# Patient Record
Sex: FEMALE | Race: WHITE | NOT HISPANIC OR LATINO | Employment: UNEMPLOYED | URBAN - METROPOLITAN AREA
[De-identification: names, ages, dates, MRNs, and addresses within clinical notes are randomized per-mention and may not be internally consistent; named-entity substitution may affect disease eponyms.]

---

## 2019-10-03 NOTE — PROGRESS NOTES
[facility] Note    Chief Complaint      Chief Complaint   Patient presents with    Nasal Congestion    Back Pain       History of Present Illness      Leti Petersen is a 64 y.o. WF with Anxiety, Depression, Insomnia, GERD, nicotine Dependence presents to re-est care with me and to fu complaints of URI.  Last visit with me was at [facility].  Await old records for review.  Pt reports she did not get labs I ordered for her last visit.     HLD - Pt was otld this by prev PCP.  Will repeat FLP    AR - Pt on Zyrtec 10 mg/d and Flonase 2 SEN/d.    Anxiety - Pt on Sertraline 100 mg/d.    Depression - Pt is on Sertraline 100 mg/d and Trazodone 100 mg/d.      Insomnia - Pt is on Trazodone 100 mg/d.    GERD - Pt on Omeprazole prn.     Osteopenia - Rec Calcium 1000 mg/d and vit D 3 1000u/d.  Rec wt bearing exercise every other day.     Migraine aura without HA - Plans to see a new Ophtho.      Nicotine Dependence - Pt reports she had CT lung Ca screening that was wnl.  I await her old records for review. Pt cut back to 1/2 ppd.     ?Renal insuff - Pt reports I told her her kidney funciton may have been slightly low last lab check.  Will get records to review.  Will avoid NSAIDS for now.     HCM - Flu - 9/26/19;  Td - > 10 yrs;  PCV 13 - 9/26/19; Shingrix - none - will get from pharm; PAP - plans to sched with Dr. Lama;  MGM - 2/19 - wnl per pt at [facility]; DEXA - 2/19 - Osteopenia per pt at [facility]; C-scope - none - plans to sched with Dr. South; Prev Ophtho - Dr. Moreno    Past Medical History:  Past Medical History:   Diagnosis Date    Allergic rhinitis     Anxiety     Depression     GERD (gastroesophageal reflux disease)     Insomnia     Nicotine dependence     Osteopenia     Rosacea        Past Surgical History:   has a past surgical history that includes rhinoplasty.    Family History:  family history includes Alzheimer's disease in her mother; Diabetes in her brother and father; Heart attack  "(age of onset: 67) in her maternal grandfather; Hyperlipidemia in her sister and sister; Hypertension in her sister and sister; Stomach cancer in her paternal grandmother; Stroke (age of onset: 78) in her mother.     Social History:  Social History     Tobacco Use    Smoking status: Current Every Day Smoker     Packs/day: 1.00     Years: 52.00     Pack years: 52.00     Types: Cigarettes   Substance Use Topics    Alcohol use: Yes     Frequency: Monthly or less     Drinks per session: 1 or 2    Drug use: Never       Review of Systems   Constitutional: Negative for chills, diaphoresis and fever.   HENT: Positive for congestion, sore throat and tinnitus. Negative for ear pain and hearing loss.         +postnasal drip; Left ear congestion; chronic hoarseness reports neg sargent from ENT in past.    Eyes: Negative for blurred vision and double vision.        Sees aura - L > RT eye - "sees steam".    Respiratory: Negative for cough, shortness of breath and wheezing.    Cardiovascular: Positive for palpitations. Negative for chest pain.        When she lies down < 1 minute and self resolves.    Gastrointestinal: Positive for blood in stool and heartburn. Negative for abdominal pain, constipation, diarrhea, melena, nausea and vomiting.        Ran out of PPI.  Has hemorrhoids and notes blood in stool 2/month - due for C-scope and has contact to set up with Dr. South. It does not fill toilet.  She is unsure if occurs after hard stools.  Denies straining.   Genitourinary: Positive for dysuria. Negative for frequency.        Intermittent - last had this ~ 1 wk ago.    Musculoskeletal: Positive for back pain and neck pain.        She fell 2 mos ago - slipped.  She did not see anyone for this and reports midback pain and neck pain since her fall.  "I hear my neck pop when I turn it".  3/10 in severity. The neck pain can radiate to her left arm and chest.  No allev factors. Worse if she uses her Left arm a lot.  Intermittent. "   Skin: Positive for itching and rash.        Rosacea   Neurological: Positive for tingling. Negative for dizziness and weakness.        In left hand at times - since fall;  Imbalance.   Endo/Heme/Allergies: Negative for polydipsia. Does not bruise/bleed easily.   Psychiatric/Behavioral: Negative for depression. The patient is not nervous/anxious.         Medications:  Outpatient Encounter Medications as of 10/4/2019   Medication Sig Dispense Refill    cetirizine (ZYRTEC) 10 MG tablet Take 10 mg by mouth daily as needed for Allergies.      fluticasone propionate (FLONASE) 50 mcg/actuation nasal spray 1 spray by Each Nostril route as needed for Rhinitis.      metroNIDAZOLE (METROGEL) 0.75 % gel Apply topically daily as needed.      omeprazole (PRILOSEC) 20 MG capsule Take 20 mg by mouth daily as needed.      sertraline (ZOLOFT) 100 MG tablet Take 100 mg by mouth once daily.      traZODone (DESYREL) 100 MG tablet Take 100 mg by mouth every evening.      FLUAD 6954-0951, 65 YR UP,,PF, 45 mcg (15 mcg x 3)/0.5 mL Syrg ADM 0.5ML IM UTD  0    tiZANidine (ZANAFLEX) 2 MG tablet Take 1 tablet (2 mg total) by mouth nightly as needed. 30 tablet 0     No facility-administered encounter medications on file as of 10/4/2019.        Current Outpatient Medications:     cetirizine (ZYRTEC) 10 MG tablet, Take 10 mg by mouth daily as needed for Allergies., Disp: , Rfl:     fluticasone propionate (FLONASE) 50 mcg/actuation nasal spray, 1 spray by Each Nostril route as needed for Rhinitis., Disp: , Rfl:     metroNIDAZOLE (METROGEL) 0.75 % gel, Apply topically daily as needed., Disp: , Rfl:     omeprazole (PRILOSEC) 20 MG capsule, Take 20 mg by mouth daily as needed., Disp: , Rfl:     sertraline (ZOLOFT) 100 MG tablet, Take 100 mg by mouth once daily., Disp: , Rfl:     traZODone (DESYREL) 100 MG tablet, Take 100 mg by mouth every evening., Disp: , Rfl:     FLUAD [id number], 65 YR UP,,PF, 45 mcg (15 mcg x 3)/0.5 mL Syrg, ADM  "0.5ML IM UTD, Disp: , Rfl: 0    tiZANidine (ZANAFLEX) 2 MG tablet, Take 1 tablet (2 mg total) by mouth nightly as needed., Disp: 30 tablet, Rfl: 0    Allergies:  Review of patient's allergies indicates:  No Known Allergies    Health Maintenance:  Immunization History   Administered Date(s) Administered    Pneumococcal Conjugate - 13 Valent 09/26/2019      Health Maintenance   Topic Date Due    Hepatitis C Screening  1955    Lipid Panel  1955    TETANUS VACCINE  06/14/1971    Mammogram  06/14/1993    DEXA SCAN  06/14/1993    Colonoscopy  06/14/2003    LDCT Lung Screen  06/14/2008    Pneumococcal Vaccine (65+ Low/Medium Risk) (2 of 2 - PPSV23) 09/26/2020      Objective:      Vital Signs  Temp: 98.3 °F (36.8 °C)  Temp src: Oral  Pulse: 76  Resp: 14  SpO2: 96 %  BP: 108/65  BP Location: Left arm  Patient Position: Sitting  Pain Score:   2  Pain Loc: Back  Height and Weight  Height: 5' 6" (167.6 cm)  Weight: 66 kg (145 lb 8.1 oz)  BSA (Calculated - sq m): 1.75 sq meters  BMI (Calculated): 23.5  Weight in (lb) to have BMI = 25: 154.6]    Laboratory:    Physical Exam   Constitutional: She is oriented to person, place, and time. She appears well-developed and well-nourished. No distress.   Hoarseness - chronic    HENT:   Head: Normocephalic and atraumatic.   Left cerumen impaction;  RT TM - wnl   Eyes: Pupils are equal, round, and reactive to light. EOM are normal.   manohar Cataracts?   Neck: Normal range of motion. Neck supple. No thyromegaly present.   Cardiovascular: Normal rate, regular rhythm, normal heart sounds and intact distal pulses.   Pulmonary/Chest: Effort normal. No respiratory distress. She has no wheezes.   Abdominal: Soft. Bowel sounds are normal. She exhibits no distension and no mass. There is no tenderness. There is no rebound.   Musculoskeletal:   Pain on forward flexion of neck and flexion and rotation of neck  to the left.  + Tension over manohar paraspinal muscles.  NTTP;  No CVA TTP. " Neg straight leg raise manohar. Pain to spine when rotates to RT and flexes laterally to left and on forward flexion.  FROM.    Lymphadenopathy:     She has no cervical adenopathy.   Neurological: She is alert and oriented to person, place, and time.   Gait wnl;  Rhomberg neg; no Pronator drift;  Patellar DTR's - 2+ manohar;  IGNACIO - wnl;  Finger to nose - wnl   Skin: Skin is warm and dry. She is not diaphoretic.   Psychiatric: She has a normal mood and affect.   Nursing note and vitals reviewed.          Assessment:       1. Hyperlipidemia, unspecified hyperlipidemia type    2. Dysuria    3. Acute neck pain    4. Acute thoracic back pain, unspecified back pain laterality    5. Osteopenia, unspecified location    6. Insomnia, unspecified type    7. Depression, unspecified depression type    8. Anxiety    9. Allergic rhinitis, unspecified seasonality, unspecified trigger    10. Cigarette nicotine dependence without complication        Leti Petersen is a 64 y.o.female with:    1. Hyperlipidemia, unspecified hyperlipidemia type  - Comprehensive metabolic panel; Future  - Lipid panel; Future  Will repeat FLP and get old records to review.    2. Dysuria  - POCT urine dipstick without microscope  - Urinalysis Microscopic  - Urine culture  Dip UA - + 250 Bld - will check UA microscopy and Ucx.    3. Acute neck pain  - tiZANidine (ZANAFLEX) 2 MG tablet; Take 1 tablet (2 mg total) by mouth nightly as needed.  Dispense: 30 tablet; Refill: 0  -Will avoid NSAIDS given ? Renal insufficiency.  Will try a trial of Tizanidine 2 mg po QHS prn pain.  Warned pt not to drive after taking.  Offered PTx but pt declined due to transportation and said she will alert me if she changes her mind. Suspect strain and DJD    4. Acute thoracic back pain, unspecified back pain laterality  - tiZANidine (ZANAFLEX) 2 MG tablet; Take 1 tablet (2 mg total) by mouth nightly as needed.  Dispense: 30 tablet; Refill: 0  -Will avoid NSAIDS given ? Renal  insufficiency.  Will try a trial of Tizanidine 2 mg po QHS prn pain.  Warned pt not to drive after taking.  Offered PTx but pt declined due to transportation and said she will alert me if she changes her mind. Suspect strain. If no improvement consider Xrays    5. Osteopenia, unspecified location  -Rec calcium + D as above.    6. Insomnia, unspecified type  -Stable on current    7. Depression, unspecified depression type  -Stable on current    8. Anxiety  -Stable on current    9. Allergic rhinitis, unspecified seasonality, unspecified trigger  -Cont Zyrtec and Flonase.     10. Cigarette nicotine dependence without complication  -Will get old records to review recent CT chest.        Chronic conditions status updated as per HPI.  Other than changes above, cont current medications and maintain follow up with specialists.  Return to clinic in 6 wks.    Huma Marti MD  [facility]

## 2019-10-04 ENCOUNTER — OFFICE VISIT (OUTPATIENT)
Dept: PRIMARY CARE CLINIC | Facility: CLINIC | Age: 64
End: 2019-10-04
Payer: MEDICARE

## 2019-10-04 ENCOUNTER — TELEPHONE (OUTPATIENT)
Dept: PRIMARY CARE CLINIC | Facility: CLINIC | Age: 64
End: 2019-10-04

## 2019-10-04 VITALS
BODY MASS INDEX: 23.38 KG/M2 | HEART RATE: 76 BPM | DIASTOLIC BLOOD PRESSURE: 65 MMHG | HEIGHT: 66 IN | TEMPERATURE: 98 F | RESPIRATION RATE: 14 BRPM | WEIGHT: 145.5 LBS | OXYGEN SATURATION: 96 % | SYSTOLIC BLOOD PRESSURE: 108 MMHG

## 2019-10-04 DIAGNOSIS — F17.210 CIGARETTE NICOTINE DEPENDENCE WITHOUT COMPLICATION: ICD-10-CM

## 2019-10-04 DIAGNOSIS — M85.80 OSTEOPENIA, UNSPECIFIED LOCATION: ICD-10-CM

## 2019-10-04 DIAGNOSIS — G47.00 INSOMNIA, UNSPECIFIED TYPE: ICD-10-CM

## 2019-10-04 DIAGNOSIS — M54.2 ACUTE NECK PAIN: ICD-10-CM

## 2019-10-04 DIAGNOSIS — R30.0 DYSURIA: ICD-10-CM

## 2019-10-04 DIAGNOSIS — J30.9 ALLERGIC RHINITIS, UNSPECIFIED SEASONALITY, UNSPECIFIED TRIGGER: ICD-10-CM

## 2019-10-04 DIAGNOSIS — F32.A DEPRESSION, UNSPECIFIED DEPRESSION TYPE: ICD-10-CM

## 2019-10-04 DIAGNOSIS — E78.5 HYPERLIPIDEMIA, UNSPECIFIED HYPERLIPIDEMIA TYPE: Primary | ICD-10-CM

## 2019-10-04 DIAGNOSIS — M54.6 ACUTE THORACIC BACK PAIN, UNSPECIFIED BACK PAIN LATERALITY: ICD-10-CM

## 2019-10-04 DIAGNOSIS — F41.9 ANXIETY: ICD-10-CM

## 2019-10-04 LAB
BILIRUB SERPL-MCNC: NEGATIVE MG/DL
BLOOD URINE, POC: ABNORMAL
COLOR, POC UA: YELLOW
GLUCOSE UR QL STRIP: NORMAL
KETONES UR QL STRIP: NEGATIVE
LEUKOCYTE ESTERASE URINE, POC: ABNORMAL
MICROSCOPIC COMMENT: ABNORMAL
NITRITE, POC UA: NEGATIVE
PH, POC UA: 6
PROTEIN, POC: NEGATIVE
RBC #/AREA URNS AUTO: 12 /HPF (ref 0–4)
SPECIFIC GRAVITY, POC UA: 1
SQUAMOUS #/AREA URNS AUTO: 0 /HPF
UROBILINOGEN, POC UA: NORMAL

## 2019-10-04 PROCEDURE — 87086 URINE CULTURE/COLONY COUNT: CPT

## 2019-10-04 PROCEDURE — 99499 UNLISTED E&M SERVICE: CPT | Mod: S$GLB,,, | Performed by: INTERNAL MEDICINE

## 2019-10-04 PROCEDURE — 3288F FALL RISK ASSESSMENT DOCD: CPT | Mod: CPTII,S$GLB,, | Performed by: INTERNAL MEDICINE

## 2019-10-04 PROCEDURE — 81002 URINALYSIS NONAUTO W/O SCOPE: CPT | Mod: S$GLB,,, | Performed by: INTERNAL MEDICINE

## 2019-10-04 PROCEDURE — 81002 POCT URINE DIPSTICK WITHOUT MICROSCOPE: ICD-10-PCS | Mod: S$GLB,,, | Performed by: INTERNAL MEDICINE

## 2019-10-04 PROCEDURE — 1100F PR PT FALLS ASSESS DOC 2+ FALLS/FALL W/INJURY/YR: ICD-10-PCS | Mod: CPTII,S$GLB,, | Performed by: INTERNAL MEDICINE

## 2019-10-04 PROCEDURE — 99999 PR PBB SHADOW E&M-NEW PATIENT-LVL III: CPT | Mod: PBBFAC,,, | Performed by: INTERNAL MEDICINE

## 2019-10-04 PROCEDURE — 99999 PR PBB SHADOW E&M-NEW PATIENT-LVL III: ICD-10-PCS | Mod: PBBFAC,,, | Performed by: INTERNAL MEDICINE

## 2019-10-04 PROCEDURE — 3288F PR FALLS RISK ASSESSMENT DOCUMENTED: ICD-10-PCS | Mod: CPTII,S$GLB,, | Performed by: INTERNAL MEDICINE

## 2019-10-04 PROCEDURE — 81001 URINALYSIS AUTO W/SCOPE: CPT

## 2019-10-04 PROCEDURE — 99214 PR OFFICE/OUTPT VISIT, EST, LEVL IV, 30-39 MIN: ICD-10-PCS | Mod: 25,S$GLB,, | Performed by: INTERNAL MEDICINE

## 2019-10-04 PROCEDURE — 99499 RISK ADDL DX/OHS AUDIT: ICD-10-PCS | Mod: S$GLB,,, | Performed by: INTERNAL MEDICINE

## 2019-10-04 PROCEDURE — 1100F PTFALLS ASSESS-DOCD GE2>/YR: CPT | Mod: CPTII,S$GLB,, | Performed by: INTERNAL MEDICINE

## 2019-10-04 PROCEDURE — 99214 OFFICE O/P EST MOD 30 MIN: CPT | Mod: 25,S$GLB,, | Performed by: INTERNAL MEDICINE

## 2019-10-04 RX ORDER — FLUTICASONE PROPIONATE 50 MCG
2 SPRAY, SUSPENSION (ML) NASAL
Qty: 1 BOTTLE | Refills: 4 | Status: SHIPPED | OUTPATIENT
Start: 2019-10-04

## 2019-10-04 RX ORDER — TRAZODONE HYDROCHLORIDE 100 MG/1
100 TABLET ORAL NIGHTLY
Qty: 90 TABLET | Refills: 3 | Status: SHIPPED | OUTPATIENT
Start: 2019-10-04

## 2019-10-04 RX ORDER — TIZANIDINE 2 MG/1
2 TABLET ORAL NIGHTLY PRN
Qty: 30 TABLET | Refills: 0 | Status: SHIPPED | OUTPATIENT
Start: 2019-10-04 | End: 2019-11-03

## 2019-10-04 RX ORDER — TRAZODONE HYDROCHLORIDE 100 MG/1
100 TABLET ORAL NIGHTLY
COMMUNITY
End: 2019-10-04 | Stop reason: SDUPTHER

## 2019-10-04 RX ORDER — OMEPRAZOLE 20 MG/1
20 CAPSULE, DELAYED RELEASE ORAL DAILY PRN
COMMUNITY
End: 2019-10-08 | Stop reason: SDUPTHER

## 2019-10-04 RX ORDER — FLUTICASONE PROPIONATE 50 MCG
1 SPRAY, SUSPENSION (ML) NASAL
COMMUNITY
End: 2019-10-04 | Stop reason: SDUPTHER

## 2019-10-04 RX ORDER — SERTRALINE HYDROCHLORIDE 100 MG/1
100 TABLET, FILM COATED ORAL DAILY
COMMUNITY
End: 2019-10-04 | Stop reason: SDUPTHER

## 2019-10-04 RX ORDER — SERTRALINE HYDROCHLORIDE 100 MG/1
100 TABLET, FILM COATED ORAL DAILY
Qty: 90 TABLET | Refills: 3 | Status: SHIPPED | OUTPATIENT
Start: 2019-10-04

## 2019-10-04 RX ORDER — METRONIDAZOLE 7.5 MG/G
GEL TOPICAL DAILY PRN
COMMUNITY

## 2019-10-04 RX ORDER — CETIRIZINE HYDROCHLORIDE 10 MG/1
10 TABLET ORAL DAILY PRN
COMMUNITY

## 2019-10-04 NOTE — PROGRESS NOTES
Please call the patient regarding her abnormal result. She had possible blood in her urine.  Will send for UA with microscopy and UCx.

## 2019-10-04 NOTE — TELEPHONE ENCOUNTER
----- Message from Huma Marti MD sent at 10/4/2019 11:52 AM CDT -----  Please call the patient regarding her abnormal result. She had possible blood in her urine.  Will send for UA with microscopy and UCx.

## 2019-10-05 LAB — BACTERIA UR CULT: NORMAL

## 2019-10-07 DIAGNOSIS — R31.29 MICROSCOPIC HEMATURIA: Primary | ICD-10-CM

## 2019-10-07 DIAGNOSIS — K21.9 GASTROESOPHAGEAL REFLUX DISEASE, ESOPHAGITIS PRESENCE NOT SPECIFIED: Primary | ICD-10-CM

## 2019-10-07 NOTE — PROGRESS NOTES
[facility] Note    Chief Complaint    No chief complaint on file.      History of Present Illness      Leti Petersen is a 64 y.o. female with   Assessment:       1. Microscopic hematuria        Leti Petersen is a 64 y.o.female with:    1. Microscopic hematuria  - Ambulatory referral to Urology       Chronic conditions status updated as per HPI.  Other than changes above, cont current medications and maintain follow up with specialists.   Huma Marti MD  [facility]

## 2019-10-07 NOTE — TELEPHONE ENCOUNTER
----- Message from Huma Marti MD sent at 10/7/2019  1:22 PM CDT -----  Pt's UA showed no evid of UTI but she did have microscopic hematuria.  Will refer to gU. Please inform her. I put in order.  Please push through.

## 2019-10-07 NOTE — PROGRESS NOTES
Pt's UA showed no evid of UTI but she did have microscopic hematuria.  Will refer to gU. Please inform her. I put in order.  Please push through.

## 2019-10-08 RX ORDER — OMEPRAZOLE 20 MG/1
20 CAPSULE, DELAYED RELEASE ORAL DAILY PRN
Qty: 90 CAPSULE | Refills: 1 | Status: SHIPPED | OUTPATIENT
Start: 2019-10-08

## 2019-10-08 NOTE — TELEPHONE ENCOUNTER
----- Message from Marichuy Melendez sent at 10/8/2019 10:33 AM CDT -----  Contact: Self [phone]  Patient would like to get test results  Name of test (lab, mammo, etc.):   Urine  Date of test:  10/11  Ordering provider: Dr. Marti   Where was the test performed:  [facility]  Would the patient rather a call back or a response via [facility]?:  Call back   Comments:

## 2019-10-08 NOTE — TELEPHONE ENCOUNTER
I suspect she means Dr. Hilario Doyle.  Ok to send referral. I signed order for her Omeprazole    Dr. JANE

## 2019-10-08 NOTE — TELEPHONE ENCOUNTER
I sw pt and went over her urine results. Pt does not want to see an [facility] . She would prefer to see an [facility] one. She is going to call back with the info of who she wants to see but believes the name is Kalpana Doyle Np. Also, she request a refill on Omeprazole. Please sign pended rx for this.

## 2019-10-08 NOTE — TELEPHONE ENCOUNTER
I mentioned Hilario Doyle but she said that was not him. The person she is referring to is a female

## 2019-10-25 ENCOUNTER — PATIENT OUTREACH (OUTPATIENT)
Dept: ADMINISTRATIVE | Facility: HOSPITAL | Age: 64
End: 2019-10-25

## 2019-10-25 NOTE — PROGRESS NOTES
Pre-visit chart review completed.  Immunizations reviewed and updated.    Patient due for the following    Hepatitis C Screening     Lipid Panel     TETANUS VACCINE     Mammogram     DEXA SCAN     Shingles Vaccine (1 of 2)    Colonoscopy     LDCT Lung Screen

## 2019-11-22 ENCOUNTER — TELEPHONE (OUTPATIENT)
Dept: PRIMARY CARE CLINIC | Facility: CLINIC | Age: 64
End: 2019-11-22

## 2019-11-22 DIAGNOSIS — F41.9 ANXIETY: ICD-10-CM

## 2019-11-22 DIAGNOSIS — G47.00 INSOMNIA, UNSPECIFIED TYPE: ICD-10-CM

## 2019-11-22 DIAGNOSIS — F32.A DEPRESSION, UNSPECIFIED DEPRESSION TYPE: ICD-10-CM

## 2019-11-22 RX ORDER — TRAZODONE HYDROCHLORIDE 100 MG/1
100 TABLET ORAL NIGHTLY
Qty: 90 TABLET | Refills: 3 | Status: CANCELLED | OUTPATIENT
Start: 2019-11-22

## 2019-11-22 RX ORDER — SERTRALINE HYDROCHLORIDE 100 MG/1
100 TABLET, FILM COATED ORAL DAILY
Qty: 90 TABLET | Refills: 3 | Status: CANCELLED | OUTPATIENT
Start: 2019-11-22

## 2019-11-22 NOTE — TELEPHONE ENCOUNTER
Pt requesting refill on medications. Left voice message to inform pt she has refill on file at the pharmacy. To call office with any questions or concerns.  Víctor Sandoval LPN

## 2019-11-22 NOTE — TELEPHONE ENCOUNTER
Pt requesting refill on medications. Please authorize. Medications pended. Pharmacy verified.  Lov: 10/04/2019.  Víctor Sandoval LPN

## 2019-11-22 NOTE — TELEPHONE ENCOUNTER
----- Message from Shilpa Romero sent at 11/22/2019  9:09 AM CST -----  Contact: self/[phone]  Pt called in regards to getting a Rx refill for     trazodone (DESYREL) 100 MG tablet 90 tablet 3 10/4/2019 No   Take 1 tablet (100 mg total) by mouth every evening.     sertraline (ZOLOFT) 100 MG tablet 90 tablet 3 10/4/2019    No Take 1 tablet (100 mg total) by mouth once daily    Pt is going out of town at 1:00 pm and would like to get her Rx ASAP.     [phone]  Please advise

## 2020-05-15 DIAGNOSIS — Z12.11 COLON CANCER SCREENING: ICD-10-CM

## 2020-06-16 ENCOUNTER — TELEPHONE (OUTPATIENT)
Dept: PRIMARY CARE CLINIC | Facility: CLINIC | Age: 65
End: 2020-06-16

## 2020-06-16 NOTE — TELEPHONE ENCOUNTER
----- Message from Huma Marti MD sent at 6/15/2020  7:43 PM CDT -----  Pt planning for C-scope with Dr. South. Please remind her to call him to set this up.  He will likely do a virtual visit first     Dr. JANE